# Patient Record
Sex: MALE | Race: WHITE | Employment: FULL TIME | URBAN - METROPOLITAN AREA
[De-identification: names, ages, dates, MRNs, and addresses within clinical notes are randomized per-mention and may not be internally consistent; named-entity substitution may affect disease eponyms.]

---

## 2020-09-27 ENCOUNTER — HOSPITAL ENCOUNTER (EMERGENCY)
Age: 25
Discharge: HOME | End: 2020-09-27
Attending: EMERGENCY MEDICINE
Payer: COMMERCIAL

## 2020-09-27 ENCOUNTER — APPOINTMENT (OUTPATIENT)
Dept: GENERAL RADIOLOGY | Age: 25
End: 2020-09-27
Attending: EMERGENCY MEDICINE
Payer: COMMERCIAL

## 2020-09-27 VITALS
TEMPERATURE: 98.7 F | SYSTOLIC BLOOD PRESSURE: 108 MMHG | HEART RATE: 70 BPM | RESPIRATION RATE: 16 BRPM | OXYGEN SATURATION: 99 % | DIASTOLIC BLOOD PRESSURE: 64 MMHG | WEIGHT: 150 LBS | BODY MASS INDEX: 25.61 KG/M2 | HEIGHT: 64 IN

## 2020-09-27 DIAGNOSIS — S43.014A ANTERIOR DISLOCATION OF RIGHT SHOULDER, INITIAL ENCOUNTER: Primary | ICD-10-CM

## 2020-09-27 PROCEDURE — 99284 EMERGENCY DEPT VISIT MOD MDM: CPT

## 2020-09-27 PROCEDURE — 74011250636 HC RX REV CODE- 250/636: Performed by: EMERGENCY MEDICINE

## 2020-09-27 PROCEDURE — 73020 X-RAY EXAM OF SHOULDER: CPT

## 2020-09-27 PROCEDURE — 75810000301 HC ER LEVEL 1 CLOSED TREATMNT FRACTURE/DISLOCATION

## 2020-09-27 PROCEDURE — 73030 X-RAY EXAM OF SHOULDER: CPT

## 2020-09-27 RX ORDER — MIDAZOLAM HYDROCHLORIDE 1 MG/ML
2 INJECTION, SOLUTION INTRAMUSCULAR; INTRAVENOUS ONCE
Status: COMPLETED | OUTPATIENT
Start: 2020-09-27 | End: 2020-09-27

## 2020-09-27 RX ORDER — DICLOFENAC SODIUM 50 MG/1
50 TABLET, DELAYED RELEASE ORAL 3 TIMES DAILY
Qty: 30 TAB | Refills: 0 | Status: SHIPPED | OUTPATIENT
Start: 2020-09-27 | End: 2020-10-07

## 2020-09-27 RX ORDER — PROPOFOL 10 MG/ML
100 INJECTION, EMULSION INTRAVENOUS
Status: COMPLETED | OUTPATIENT
Start: 2020-09-27 | End: 2020-09-27

## 2020-09-27 RX ADMIN — MIDAZOLAM 2 MG: 1 INJECTION INTRAMUSCULAR; INTRAVENOUS at 16:45

## 2020-09-27 RX ADMIN — PROPOFOL 100 MG: 10 INJECTION, EMULSION INTRAVENOUS at 16:55

## 2020-09-27 NOTE — ED TRIAGE NOTES
Patient to triage via wheelchair; mask in place. Patient reports dislocating his right shoulder while playing hurling. Reports hx of dislocations of this shoulder.

## 2020-09-27 NOTE — DISCHARGE INSTRUCTIONS
Wear the sling at all times for the next 2 to 3 days. You may remove it when you shower but try to keep your arm still when you do so. You will have a lot of swelling in and around your shoulder joint. You may ice it for 10 to 15 minutes at a time 3-4 times a day to try to help decrease the swelling. Given how many times you have dislocated your shoulder I encourage you to call an orthopedic surgeon to be evaluated for a potential surgical correction. Please call his office to make the appointment.     No physical activity involving use of your right arm for at least 4 weeks and until you are pain-free, or until you are cleared by the orthopedist.

## 2020-09-27 NOTE — ED PROVIDER NOTES
43-year-old gentleman presents with concerns about a right shoulder dislocation. He says this is happened to him 9 or 10 times. He says he does not have the money to follow-up with an orthopedist to get it permanently fixed. No fevers or chills and says he has no other injury. He said he was playing the [employer]. No other associated symptoms. Elements of this note were created using speech recognition software. As such, errors of speech recognition may be present. No past medical history on file. No past surgical history on file. No family history on file.     Social History     Socioeconomic History    Marital status: Not on file     Spouse name: Not on file    Number of children: Not on file    Years of education: Not on file    Highest education level: Not on file   Occupational History    Not on file   Social Needs    Financial resource strain: Not on file    Food insecurity     Worry: Not on file     Inability: Not on file    Transportation needs     Medical: Not on file     Non-medical: Not on file   Tobacco Use    Smoking status: Not on file   Substance and Sexual Activity    Alcohol use: Not on file    Drug use: Not on file    Sexual activity: Not on file   Lifestyle    Physical activity     Days per week: Not on file     Minutes per session: Not on file    Stress: Not on file   Relationships    Social connections     Talks on phone: Not on file     Gets together: Not on file     Attends Holiness service: Not on file     Active member of club or organization: Not on file     Attends meetings of clubs or organizations: Not on file     Relationship status: Not on file    Intimate partner violence     Fear of current or ex partner: Not on file     Emotionally abused: Not on file     Physically abused: Not on file     Forced sexual activity: Not on file   Other Topics Concern    Not on file   Social History Narrative    Not on file         ALLERGIES: Patient has no known allergies. Review of Systems   Constitutional: Negative for chills and fever. Musculoskeletal: Positive for arthralgias and myalgias. Negative for joint swelling. Skin: Negative for color change. Vitals:    09/27/20 1555   BP: 99/65   Pulse: 78   Resp: 16   Temp: 98.7 °F (37.1 °C)   SpO2: 96%   Weight: 68 kg (150 lb)   Height: 5' 4\" (1.626 m)            Physical Exam  Vitals signs and nursing note reviewed. Constitutional:       Appearance: Normal appearance. Musculoskeletal:      Comments: Obvious right shoulder dislocation    Normal radial pulses. Normal sensation and motor function of hand and arm. Skin:     General: Skin is warm and dry. Neurological:      General: No focal deficit present. Mental Status: He is alert. [facility]  ED Course as of Sep 27 1812   Sun Sep 27, 2020   1809 Patient doing well. He was able to ambulate without dizziness or lightheadedness.   I will discharge him home.    [AC]      ED Course User Index  [AC] Ady Rao MD       Procedural Sedation    Date/Time: 9/27/2020 6:13 PM  Performed by: Ady Rao MD  Authorized by: Ady Rao MD     Consent:     Consent obtained:  Written    Consent given by:  Patient    Risks discussed:  Inadequate sedation and respiratory compromise necessitating ventilatory assistance and intubation    Alternatives discussed:  Analgesia without sedation  Indications:     Procedure performed:  Dislocation reduction    Procedure necessitating sedation performed by:  Physician performing sedation    Intended level of sedation:  Moderate (conscious sedation)  Pre-sedation assessment:     ASA classification: class 1 - normal, healthy patient      Neck mobility: normal      Mouth opening:  3 or more finger widths    Thyromental distance:  4 finger widths    Mallampati score:  I - soft palate, uvula, fauces, pillars visible    Pre-sedation assessments completed and reviewed: airway patency, cardiovascular function and mental status      History of difficult intubation: no    Immediate pre-procedure details:     Reassessment: Patient reassessed immediately prior to procedure      Reviewed: vital signs      Verified: bag valve mask available    Procedure details (see MAR for exact dosages):     Preoxygenation:  Nasal cannula    Sedation:  Propofol    Intra-procedure monitoring:  Cardiac monitor, blood pressure monitoring and continuous pulse oximetry    Intra-procedure events: none    Post-procedure details:     Specimens recovered:  None    Patient is stable for discharge or admission: yes      Patient tolerance: Tolerated well, no immediate complications  Reduction of Joint    Date/Time: 9/27/2020 6:14 PM  Performed by: Margoth Meng MD  Authorized by: Margoth Meng MD     Consent:     Consent obtained:  Written    Consent given by:  Patient    Risks discussed:  Pain and vascular damage    Alternatives discussed:  No treatment  Injury:     Injury location:  Shoulder    Shoulder injury location:  R shoulder    Hill-Sachs deformity: no    Pre-procedure assessment:     Neurological function: normal      Distal perfusion: normal      Range of motion: reduced    Sedation:     Sedation type: Moderate (conscious) sedation  Anesthesia (see MAR for exact dosages): Anesthesia method:  None  Procedure details:     Manipulation performed: yes      Immobilization:  Sling  Post-procedure assessment:     Neurological function: normal      Distal perfusion: normal      Range of motion: normal      Patient tolerance of procedure:   Tolerated well, no immediate complications

## 2020-09-27 NOTE — ED NOTES
I have reviewed discharge instructions with the patient. The patient verbalized understanding. Patient left ED via Discharge Method: ambulatory to Home with Father. Opportunity for questions and clarification provided. Patient given 1 scripts. No esign       To continue your aftercare when you leave the hospital, you may receive an automated call from our care team to check in on how you are doing. This is a free service and part of our promise to provide the best care and service to meet your aftercare needs.  If you have questions, or wish to unsubscribe from this service please call [phone]. Thank you for Choosing our [facility] Emergency Department.

## 2025-04-30 ENCOUNTER — OFFICE VISIT (OUTPATIENT)
Dept: ORTHOPEDIC SURGERY | Age: 30
End: 2025-04-30
Payer: COMMERCIAL

## 2025-04-30 VITALS — WEIGHT: 140 LBS | BODY MASS INDEX: 23.9 KG/M2 | HEIGHT: 64 IN

## 2025-04-30 DIAGNOSIS — M25.562 LEFT KNEE PAIN, UNSPECIFIED CHRONICITY: Primary | ICD-10-CM

## 2025-04-30 PROCEDURE — 99203 OFFICE O/P NEW LOW 30 MIN: CPT | Performed by: PHYSICIAN ASSISTANT

## 2025-04-30 NOTE — PROGRESS NOTES
[facility]          Patient ID:  Name: Malachi Alonzo  AGE/Gender: 30 y.o. male  MRN: [id number]  : 1995    Date of Consultation:  2025          ALLERGIES: No Known Allergies     CC:  Left Knee Pain    History: The patient presents today as a new patient complaining of   Left knee pain.  This started 2-1/2 weeks ago.  He does not recall any specific injury.  He does play [employer] hurling and started having pain after competing for couple days and thought this would get better with but it is continued to bother him.  He localizes this more to the anterior medial aspect of the left knee.  This bothers him with stairs.  He has some tightness at the lateral aspect of the knee.  He denies any catching or locking.  No prior knee surgeries.  He does have a history of chronic shoulder instability with recurrent dislocations. They have no other complaints or concerns.    Review of Systems:  Pertinent items are noted in HPI.    Past Medical History Includes: No past medical history on file., No past surgical history on file.    Family History: No family history on file.     Social History:   Social History     Tobacco Use    Smoking status: Some Days     Types: Cigarettes    Smokeless tobacco: Never   Substance Use Topics    Alcohol use: Not on file         Physical Exam:     General:  On exam the patient is a pleasant 30 y.o. male in no acute distress, A&O x 3. Ambulates without a walker or cane  MSK:  (Knees)  Right knee   No redness, rashes or wounds  No palpable effusion  ROM 0-130   Negative lateral joint line tenderness   Negative medial joint line tenderness  Patella is non-tender  No medial, lateral, posterior or anterior instability   No posterior lateral instability  No extension lag  Quad strength is 5/5  Good tibial step-off  negative lateral Josué.   negative medial Josué.   Calves Are non-tender  Sensation is normal  Negative Homans.   Good quad tone    Left knee  No

## 2025-05-02 ENCOUNTER — HOSPITAL ENCOUNTER (OUTPATIENT)
Dept: PHYSICAL THERAPY | Age: 30
Setting detail: RECURRING SERIES
Discharge: HOME | End: 2025-05-05
Payer: COMMERCIAL

## 2025-05-02 DIAGNOSIS — M22.2X2 PATELLOFEMORAL DISORDERS, LEFT KNEE: ICD-10-CM

## 2025-05-02 DIAGNOSIS — M25.562 LEFT KNEE PAIN, UNSPECIFIED CHRONICITY: Primary | ICD-10-CM

## 2025-05-02 DIAGNOSIS — R26.2 DIFFICULTY IN WALKING, NOT ELSEWHERE CLASSIFIED: ICD-10-CM

## 2025-05-02 PROCEDURE — 97161 PT EVAL LOW COMPLEX 20 MIN: CPT

## 2025-05-02 PROCEDURE — 97110 THERAPEUTIC EXERCISES: CPT

## 2025-05-02 ASSESSMENT — PAIN SCALES - GENERAL: PAINLEVEL_OUTOF10: 4

## 2025-05-02 NOTE — THERAPY EVALUATION
has not experienced knee swelling throughout this time nor any instability. His symptom pattern is with him awakening with knee stiffness and experiencing increased knee pain at the end of the day.   Patient Stated Goal(s):  \"Diagnose soreness, be able to play on 5/17\"  Initial Pain Level:      4/10   Post Session Pain Level:     4/10  Past Medical History/Comorbidities:   Mr. Alonzo  has no past medical history on file.  Mr. Alonzo  has no past surgical history on file. R ankle sprains. Shoulder dislocation w/ surgical repair.   Social History/Living Environment:   Patient lives with an adult   5 steps at home  Prior Level of Function/Work/Activity:   Prior Level of Function: Independent   Current Level of Function: Mod Independent   Occupation:       Learning:   Does the patient/guardian have any barriers to learning?: No barriers    Fall Risk Scale:   Rockwell Total Score: 0    Dominant Side:  right handed     OBJECTIVE   Observation  Pt demonstrates no peripatellar or joint line effusion of either knee. Neutral standing knee alignment, well developed quadriceps L/R.     ROM (Left/Right  in °) AROM(PROM)  Knee extension  0(+5)/0(+5)  Knee flexion   143 (pain)at end range/147    Strength (Left / Right)   Knee extension  85ft-lbs/115 ft-lbs (@ 70 deg knee flexion)  Knee flexion   49 flt-lbs/53 ft-lbs (@ 70 deg knee flexion    Joint Mobility  PFJ: Intact PF medial glide, tilt    Palpation  + left PF compression test. Careful left knee palpation showed pain at the left medial patellofemoral border but negative left medial/lateral joint line tenderness and negative left collateral ligament tenderness.     Special Tests  Neg tests (L and R) Varus/valgus at 0 and 20 deg, Lachmann, bounce home, Josué's, Ege's, Thessaly, Posterior drawer, posterior sag .     Outcome Measure:   Tool Used: Lower Extremity Functional Scale (LEFS)  Score:  Initial: 46/80 (57.5%) Most Recent: X/80 (Date: -- )   Interpretation

## 2025-05-02 NOTE — PROGRESS NOTES
Malachi Alonzo  : 1995  Primary: Maya Wilson Sc (Robi JIMENEZ)  Secondary:  [facility] [place] @ [place]  [address] [place] [place] [address]  Phone: [phone]  Fax: [phone] Plan Frequency: 1x to 2x/week  Plan of Care/Certification Expiration Date: 25        Plan of Care/Certification Expiration Date:  Plan of Care/Certification Expiration Date: 25    Frequency/Duration: Plan Frequency: 1x to 2x/week      Time In/Out:   Time In: 932  Time Out: 1015      PT Visit Info:         Visit Count:  1    OUTPATIENT PHYSICAL THERAPY:   Treatment Note 2025       Episode  (PT - L knee pain)               Treatment Diagnosis:    Left knee pain, unspecified chronicity  Patellofemoral disorders, left knee  Difficulty in walking, not elsewhere classified  Medical/Referring Diagnosis:    Left knee pain, unspecified chronicity    Referring Physician:  Augustine Raphael PA MD Orders:  PT Eval and Treat   Return MD Appt:  TBD   Date of Onset:  Onset Date: 25     Allergies:   Patient has no known allergies.  Restrictions/Precautions:   None      Interventions Planned (Treatment may consist of any combination of the following):     See Assessment Note    Subjective Comments:   See today's initial eval report.     Initial Pain Level:     4/10  Post Session Pain Level:      4/10  Medications Last Reviewed: 2025  Updated Objective Findings:  Findings from initial evaluation unless otherwise noted:  Observation  Pt demonstrates no peripatellar or joint line effusion of either knee. Neutral standing knee alignment, well developed quadriceps L/R.     ROM (Left/Right  in °) AROM(PROM)  Knee extension  0(+5)/0(+5)  Knee flexion   143 (pain)at end range/147    Strength (Left / Right)   Knee extension  85ft-lbs/115 ft-lbs (@ 70 deg knee flexion)  Knee flexion   49 flt-lbs/53 ft-lbs (@ 70 deg knee flexion    Joint Mobility  PFJ: Intact PF medial glide, tilt    Palpation  + left PF

## 2025-05-05 ENCOUNTER — HOSPITAL ENCOUNTER (OUTPATIENT)
Dept: PHYSICAL THERAPY | Age: 30
Setting detail: RECURRING SERIES
End: 2025-05-05
Payer: COMMERCIAL

## 2025-05-07 ENCOUNTER — APPOINTMENT (OUTPATIENT)
Dept: PHYSICAL THERAPY | Age: 30
End: 2025-05-07
Payer: COMMERCIAL

## 2025-05-16 ENCOUNTER — HOSPITAL ENCOUNTER (OUTPATIENT)
Dept: PHYSICAL THERAPY | Age: 30
Setting detail: RECURRING SERIES
Discharge: HOME | End: 2025-05-19
Payer: COMMERCIAL

## 2025-05-16 PROCEDURE — 97110 THERAPEUTIC EXERCISES: CPT

## 2025-05-16 NOTE — PROGRESS NOTES
Malachi Alonzo  : 1995  Primary: Maya Wilson Sc (Robi JIMENEZ)  Secondary:  [facility] @ [place] [address] [place] [place] [address]  Phone: [phone]  Fax: [phone] Plan Frequency: 1x to 2x/week  Plan of Care/Certification Expiration Date: 25        Plan of Care/Certification Expiration Date:  Plan of Care/Certification Expiration Date: 25    Frequency/Duration: Plan Frequency: 1x to 2x/week      Time In/Out:   Time In: 0800  Time Out: 0845      PT Visit Info:         Visit Count:  2    OUTPATIENT PHYSICAL THERAPY:   Treatment Note 2025       Episode  (PT - L knee pain)               Treatment Diagnosis:    Left knee pain, unspecified chronicity  Patellofemoral disorders, left knee  Difficulty in walking, not elsewhere classified  Medical/Referring Diagnosis:    Left knee pain, unspecified chronicity    Referring Physician:  Augustine Raphael PA MD Orders:  PT Eval and Treat   Return MD Appt:  TBD   Date of Onset:  Onset Date: 25     Allergies:   Patient has no known allergies.  Restrictions/Precautions:   None      Interventions Planned (Treatment may consist of any combination of the following):     See Assessment Note    Subjective Comments:   Pt reports no pain but states that the L knee feels a little unstable.    Initial Pain Level:   0/10  Post Session Pain Level:     no increase  Medications Last Reviewed: 2025  Updated Objective Findings:  Findings from initial evaluation unless otherwise noted:  Observation  Pt demonstrates no peripatellar or joint line effusion of either knee. Neutral standing knee alignment, well developed quadriceps L/R.     ROM (Left/Right  in °) AROM(PROM)  Knee extension  0(+5)/0(+5)  Knee flexion   143 (pain)at end range/147    Strength (Left / Right)   Knee extension  85ft-lbs/115 ft-lbs (@ 70 deg knee flexion)  Knee flexion   49 flt-lbs/53 ft-lbs (@ 70 deg knee flexion    Joint Mobility  PFJ: Intact PF medial

## 2025-05-23 ENCOUNTER — HOSPITAL ENCOUNTER (OUTPATIENT)
Dept: PHYSICAL THERAPY | Age: 30
Setting detail: RECURRING SERIES
Discharge: HOME | End: 2025-05-26
Payer: COMMERCIAL

## 2025-05-23 PROCEDURE — 97110 THERAPEUTIC EXERCISES: CPT

## 2025-05-23 ASSESSMENT — PAIN SCALES - GENERAL: PAINLEVEL_OUTOF10: 2

## 2025-05-23 NOTE — PROGRESS NOTES
Malachi Alonzo  : 1995  Primary: Maya Zepedax Sc  Secondary:  [facility] @ [place] [address] [place] [address]  Phone: [phone]  Fax: [phone]    PT Visit Info:    No data recorded   OT Visit Info:  No data recorded    OUTPATIENT THERAPY: 2025  Episode  Appt Desk        Malachi Alonzo did not show for his appointment for today.  Will plan to follow up next during next appointment.    []  Attempted to call patient    Thank you,  Elvin Griffin, PT    Future Appointments   Date Time Provider Department Center   2025  8:35 AM Augustine Raphael PA [facility] AMB   2025 12:30 PM Elvin Griffin, PT [facility]   2025 12:30 PM Elvin Griffin, PT [facility]

## 2025-05-23 NOTE — PROGRESS NOTES
Malachi Alonzo  : 1995  Primary: Maya Wilson Sc (Robi JIMENEZ)  Secondary:  [facility] @ [place] [address] [address] [place] [address]  Phone: [phone]  Fax: [phone] Plan Frequency: 1x to 2x/week  Plan of Care/Certification Expiration Date: 25        Plan of Care/Certification Expiration Date:  Plan of Care/Certification Expiration Date: 25    Frequency/Duration: Plan Frequency: 1x to 2x/week      Time In/Out:   Time In: 1255  Time Out: 1338      PT Visit Info:         Visit Count:  3    OUTPATIENT PHYSICAL THERAPY:   Treatment Note 2025       Episode  (PT - L knee pain)               Treatment Diagnosis:    Left knee pain, unspecified chronicity  Patellofemoral disorders, left knee  Difficulty in walking, not elsewhere classified  Medical/Referring Diagnosis:    Left knee pain, unspecified chronicity    Referring Physician:  Augustine Raphael PA MD Orders:  PT Eval and Treat   Return MD Appt:  TBD   Date of Onset:  Onset Date: 25     Allergies:   Patient has no known allergies.  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     Initial Pain Level:   2/10  Post Session Pain Level:     no increase  Medications Last Reviewed: 2025  Updated Objective Findings:  Findings from initial evaluation unless otherwise noted:  Observation  Pt demonstrates no peripatellar or joint line effusion of either knee. Neutral standing knee alignment, well developed quadriceps L/R.     ROM (Left/Right  in °) AROM(PROM)  Knee extension  0(+5)/0(+5)  Knee flexion   143 (pain)at end range/147    Strength

## 2025-06-13 ENCOUNTER — APPOINTMENT (OUTPATIENT)
Dept: PHYSICAL THERAPY | Age: 30
End: 2025-06-13
Payer: COMMERCIAL

## 2025-06-20 ENCOUNTER — HOSPITAL ENCOUNTER (OUTPATIENT)
Dept: PHYSICAL THERAPY | Age: 30
Setting detail: RECURRING SERIES
Discharge: HOME | End: 2025-06-23
Payer: COMMERCIAL

## 2025-06-20 PROCEDURE — 97110 THERAPEUTIC EXERCISES: CPT

## 2025-06-20 PROCEDURE — 97140 MANUAL THERAPY 1/> REGIONS: CPT

## 2025-06-20 ASSESSMENT — PAIN SCALES - GENERAL: PAINLEVEL_OUTOF10: 2

## 2025-06-20 NOTE — PROGRESS NOTES
Malachi Alonzo  : 1995  Primary: Maya Wilson Sc (Robi JIMENEZ)  Secondary[facility]  [facility] [place] [address] [address] [place] [address]  Phone: [phone]  Fax: [phone] Plan Frequency: 1x to 2x/week  Plan of Care/Certification Expiration Date: 25        Plan of Care/Certification Expiration Date:  Plan of Care/Certification Expiration Date: 25    Frequency/Duration: Plan Frequency: 1x to 2x/week      Time In/Out:   Time In: 1225  Time Out: 1257      PT Visit Info:         Visit Count:  4    OUTPATIENT PHYSICAL THERAPY:   Progress Report / Treatment Note 2025       Episode  (PT - L knee pain)               Treatment Diagnosis:    Left knee pain, unspecified chronicity  Patellofemoral disorders, left knee  Difficulty in walking, not elsewhere classified  Medical/Referring Diagnosis:    Left knee pain, unspecified chronicity    Referring Physician:  Augustine Raphael PA MD Orders:  PT Eval and Treat   Return MD Appt:  TBD   Date of Onset:  Onset Date: 25     Allergies:   Patient has no known allergies.  Restrictions/Precautions:   None      Interventions Planned (Treatment may consist of any combination of the following):     See Assessment Note    Subjective Comments:   Cheri reports that he was doing well through his last visit, but has since developed lateral knee pain. This pain was increased over last weekend while playing in a tournament. He's able to pinpoint these symptoms to the side of his left knee at the lateral epicondyle, occurs when running. He notes that his current work is requiring ladder climbing.       Initial Pain Level:   2/10  Post Session Pain Level:     no increase  Medications Last Reviewed: 2025  Updated Objective Findings:  Findings from initial evaluation unless otherwise noted:  Observation  Pt demonstrates no peripatellar or joint line effusion of either knee. Neutral standing knee alignment, well developed quadriceps L/R.

## 2025-07-08 ENCOUNTER — RESULTS FOLLOW-UP (OUTPATIENT)
Dept: FAMILY MEDICINE CLINIC | Facility: CLINIC | Age: 30
End: 2025-07-08

## 2025-07-08 ENCOUNTER — OFFICE VISIT (OUTPATIENT)
Dept: FAMILY MEDICINE CLINIC | Facility: CLINIC | Age: 30
End: 2025-07-08
Payer: COMMERCIAL

## 2025-07-08 VITALS
HEIGHT: 64 IN | SYSTOLIC BLOOD PRESSURE: 100 MMHG | WEIGHT: 140 LBS | DIASTOLIC BLOOD PRESSURE: 60 MMHG | OXYGEN SATURATION: 96 % | HEART RATE: 61 BPM | BODY MASS INDEX: 23.9 KG/M2

## 2025-07-08 DIAGNOSIS — Z13.220 SCREENING CHOLESTEROL LEVEL: ICD-10-CM

## 2025-07-08 DIAGNOSIS — F32.A DEPRESSION, UNSPECIFIED DEPRESSION TYPE: Primary | ICD-10-CM

## 2025-07-08 DIAGNOSIS — N52.9 ERECTILE DYSFUNCTION, UNSPECIFIED ERECTILE DYSFUNCTION TYPE: ICD-10-CM

## 2025-07-08 DIAGNOSIS — Z11.59 ENCOUNTER FOR HEPATITIS C SCREENING TEST FOR LOW RISK PATIENT: ICD-10-CM

## 2025-07-08 DIAGNOSIS — R60.9 EDEMA, UNSPECIFIED TYPE: ICD-10-CM

## 2025-07-08 LAB
ALBUMIN SERPL-MCNC: 4.5 G/DL (ref 3.5–5)
ALBUMIN/GLOB SERPL: 1.5 (ref 1–1.9)
ALP SERPL-CCNC: 78 U/L (ref 40–129)
ALT SERPL-CCNC: 21 U/L (ref 8–55)
ANION GAP SERPL CALC-SCNC: 12 MMOL/L (ref 7–16)
AST SERPL-CCNC: 26 U/L (ref 15–37)
BASOPHILS # BLD: 0.05 K/UL (ref 0–0.2)
BASOPHILS NFR BLD: 1 % (ref 0–2)
BILIRUB SERPL-MCNC: 0.7 MG/DL (ref 0–1.2)
BUN SERPL-MCNC: 13 MG/DL (ref 6–23)
CALCIUM SERPL-MCNC: 9.6 MG/DL (ref 8.8–10.2)
CHLORIDE SERPL-SCNC: 103 MMOL/L (ref 98–107)
CHOLEST SERPL-MCNC: 135 MG/DL (ref 0–200)
CO2 SERPL-SCNC: 25 MMOL/L (ref 20–29)
CREAT SERPL-MCNC: 1.1 MG/DL (ref 0.8–1.3)
DIFFERENTIAL METHOD BLD: ABNORMAL
EOSINOPHIL # BLD: 0.12 K/UL (ref 0–0.8)
EOSINOPHIL NFR BLD: 2.3 % (ref 0.5–7.8)
ERYTHROCYTE [DISTWIDTH] IN BLOOD BY AUTOMATED COUNT: 12.2 % (ref 11.9–14.6)
GLOBULIN SER CALC-MCNC: 2.9 G/DL (ref 2.3–3.5)
GLUCOSE SERPL-MCNC: 92 MG/DL (ref 70–99)
HCT VFR BLD AUTO: 43.2 % (ref 41.1–50.3)
HCV AB SER QL: NONREACTIVE
HDLC SERPL-MCNC: 42 MG/DL (ref 40–60)
HDLC SERPL: 3.3 (ref 0–5)
HGB BLD-MCNC: 14.5 G/DL (ref 13.6–17.2)
IMM GRANULOCYTES # BLD AUTO: 0.02 K/UL (ref 0–0.5)
IMM GRANULOCYTES NFR BLD AUTO: 0.4 % (ref 0–5)
LDLC SERPL CALC-MCNC: 84 MG/DL (ref 0–100)
LYMPHOCYTES # BLD: 2.29 K/UL (ref 0.5–4.6)
LYMPHOCYTES NFR BLD: 44.4 % (ref 13–44)
MCH RBC QN AUTO: 31.5 PG (ref 26.1–32.9)
MCHC RBC AUTO-ENTMCNC: 33.6 G/DL (ref 31.4–35)
MCV RBC AUTO: 93.9 FL (ref 82–102)
MONOCYTES # BLD: 0.45 K/UL (ref 0.1–1.3)
MONOCYTES NFR BLD: 8.7 % (ref 4–12)
NEUTS SEG # BLD: 2.23 K/UL (ref 1.7–8.2)
NEUTS SEG NFR BLD: 43.2 % (ref 43–78)
NRBC # BLD: 0 K/UL (ref 0–0.2)
PLATELET # BLD AUTO: 227 K/UL (ref 150–450)
PMV BLD AUTO: 10.5 FL (ref 9.4–12.3)
POTASSIUM SERPL-SCNC: 4.5 MMOL/L (ref 3.5–5.1)
PROT SERPL-MCNC: 7.4 G/DL (ref 6.3–8.2)
RBC # BLD AUTO: 4.6 M/UL (ref 4.23–5.6)
SODIUM SERPL-SCNC: 139 MMOL/L (ref 136–145)
TRIGL SERPL-MCNC: 45 MG/DL (ref 0–150)
VLDLC SERPL CALC-MCNC: 9 MG/DL (ref 6–23)
WBC # BLD AUTO: 5.2 K/UL (ref 4.3–11.1)

## 2025-07-08 PROCEDURE — 99203 OFFICE O/P NEW LOW 30 MIN: CPT | Performed by: NURSE PRACTITIONER

## 2025-07-08 SDOH — ECONOMIC STABILITY: FOOD INSECURITY: WITHIN THE PAST 12 MONTHS, THE FOOD YOU BOUGHT JUST DIDN'T LAST AND YOU DIDN'T HAVE MONEY TO GET MORE.: PATIENT DECLINED

## 2025-07-08 SDOH — ECONOMIC STABILITY: FOOD INSECURITY: WITHIN THE PAST 12 MONTHS, YOU WORRIED THAT YOUR FOOD WOULD RUN OUT BEFORE YOU GOT MONEY TO BUY MORE.: PATIENT DECLINED

## 2025-07-08 SDOH — HEALTH STABILITY: PHYSICAL HEALTH: ON AVERAGE, HOW MANY MINUTES DO YOU ENGAGE IN EXERCISE AT THIS LEVEL?: 90 MIN

## 2025-07-08 SDOH — HEALTH STABILITY: PHYSICAL HEALTH: ON AVERAGE, HOW MANY DAYS PER WEEK DO YOU ENGAGE IN MODERATE TO STRENUOUS EXERCISE (LIKE A BRISK WALK)?: 4 DAYS

## 2025-07-08 ASSESSMENT — PATIENT HEALTH QUESTIONNAIRE - PHQ9
1. LITTLE INTEREST OR PLEASURE IN DOING THINGS: NOT AT ALL
2. FEELING DOWN, DEPRESSED OR HOPELESS: NOT AT ALL
SUM OF ALL RESPONSES TO PHQ QUESTIONS 1-9: 0

## 2025-07-08 ASSESSMENT — ENCOUNTER SYMPTOMS
ALLERGIC/IMMUNOLOGIC NEGATIVE: 1
GASTROINTESTINAL NEGATIVE: 1
RESPIRATORY NEGATIVE: 1
EYES NEGATIVE: 1

## 2025-07-08 NOTE — PROGRESS NOTES
[facility] [address]  [place] [address]   (ph) [phone] (fax) [phone]  JENNI Osborn      Chief Complaint   Patient presents with    New Patient     Establishing care, discuss ankle swelling, ADHD, and erectile dysfunction.        29 yo male comes in to get established. He wants to discuss ankle swelling, ADHD, and erectile dysfunction.           No Known Allergies    No past medical history on file.    No family history on file.    Social History     Socioeconomic History    Marital status: Single     Spouse name: Not on file    Number of children: Not on file    Years of education: Not on file    Highest education level: Not on file   Occupational History    Not on file   Tobacco Use    Smoking status: Never    Smokeless tobacco: Never   Substance and Sexual Activity    Alcohol use: Not Currently    Drug use: Yes     Types: Marijuana (Weed)    Sexual activity: Not on file   Other Topics Concern    Not on file   Social History Narrative    Not on file     Social Drivers of Health     Financial Resource Strain: Not on file   Food Insecurity: Patient Declined (7/8/2025)    Hunger Vital Sign     Worried About Running Out of Food in the Last Year: Patient declined     Ran Out of Food in the Last Year: Patient declined   Transportation Needs: Patient Declined (7/8/2025)    PRAPARE - Transportation     Lack of Transportation (Medical): Patient declined     Lack of Transportation (Non-Medical): Patient declined   Physical Activity: Sufficiently Active (7/8/2025)    Exercise Vital Sign     Days of Exercise per Week: 4 days     Minutes of Exercise per Session: 90 min   Stress: Not on file   Social Connections: Unknown (3/20/2021)    Received from [facility]    Social Connections     Frequency of Communication with Friends and Family: Not asked     Frequency of Social Gatherings with Friends and Family: Not asked   Intimate Partner

## 2025-07-17 ENCOUNTER — HOSPITAL ENCOUNTER (OUTPATIENT)
Dept: PHYSICAL THERAPY | Age: 30
Setting detail: RECURRING SERIES
Discharge: HOME | End: 2025-07-20
Payer: COMMERCIAL

## 2025-07-17 PROCEDURE — 97140 MANUAL THERAPY 1/> REGIONS: CPT

## 2025-07-17 PROCEDURE — 97110 THERAPEUTIC EXERCISES: CPT

## 2025-07-17 NOTE — PROGRESS NOTES
Malachi Alonzo  : 1995  Primary: Maya Wilson Sc (Robi JIMENEZ)  Secondary:  [facility] @ [place] [address] [address] [place] [address]  Phone: [phone]  Fax: [phone] Plan Frequency: 1x to 2x/week  Plan of Care/Certification Expiration Date: 25        Plan of Care/Certification Expiration Date:  Plan of Care/Certification Expiration Date: 25    Frequency/Duration: Plan Frequency: 1x to 2x/week      Time In/Out:   Time In: 1401  Time Out: 1446      PT Visit Info:         Visit Count:  5    OUTPATIENT PHYSICAL THERAPY:   Progress Report / Treatment Note 2025       Episode  (PT - L knee pain)               Treatment Diagnosis:    Left knee pain, unspecified chronicity  Patellofemoral disorders, left knee  Difficulty in walking, not elsewhere classified  Medical/Referring Diagnosis:    Left knee pain, unspecified chronicity    Referring Physician:  Augustine Raphael PA MD Orders:  PT Eval and Treat   Return MD Appt:  TBD   Date of Onset:  Onset Date: 25     Allergies:   Patient has no known allergies.  Restrictions/Precautions:   None      Interventions Planned (Treatment may consist of any combination of the following):     See Assessment Note    Subjective Comments:   Cheri reports milder symptoms, notes that he's been limiting his knee-stressing activities (hasn't been participating in practice sessions). He experienced L quadriceps soreness with split squats and had to limit that exercise. His quadriceps has felt tight. He plans on trying to ease back into [employer] curling practice sessions in preparation for a tournament in August.       Initial Pain Level:   2/10  Post Session Pain Level:     no increase  Medications Last Reviewed: 2025  Updated Objective Findings:  Findings from initial evaluation unless otherwise noted:  Observation  Pt demonstrates no peripatellar or joint line effusion of either knee. Neutral standing knee alignment, well

## 2025-07-20 RX ORDER — BUPROPION HYDROCHLORIDE 150 MG/1
TABLET, EXTENDED RELEASE ORAL
Qty: 60 TABLET | Refills: 1 | Status: SHIPPED | OUTPATIENT
Start: 2025-07-20

## 2025-08-04 ENCOUNTER — OFFICE VISIT (OUTPATIENT)
Dept: UROLOGY | Age: 30
End: 2025-08-04
Payer: COMMERCIAL

## 2025-08-04 DIAGNOSIS — A63.0 CONDYLOMA: ICD-10-CM

## 2025-08-04 DIAGNOSIS — N52.9 ERECTILE DYSFUNCTION, UNSPECIFIED ERECTILE DYSFUNCTION TYPE: Primary | ICD-10-CM

## 2025-08-04 PROCEDURE — 99244 OFF/OP CNSLTJ NEW/EST MOD 40: CPT | Performed by: UROLOGY

## 2025-08-04 RX ORDER — IMIQUIMOD 12.5 MG/.25G
CREAM TOPICAL
Qty: 1 EACH | Refills: 1 | Status: SHIPPED | OUTPATIENT
Start: 2025-08-04 | End: 2025-08-11

## 2025-08-04 ASSESSMENT — ENCOUNTER SYMPTOMS
BACK PAIN: 0
ABDOMINAL PAIN: 0
CONSTIPATION: 0
SKIN LESIONS: 0
WHEEZING: 0
EYE DISCHARGE: 0
EYE PAIN: 0
NAUSEA: 0
VOMITING: 0
INDIGESTION: 0
SHORTNESS OF BREATH: 0
COUGH: 0
DIARRHEA: 0
BLOOD IN STOOL: 0
HEARTBURN: 0

## 2025-08-07 ENCOUNTER — HOSPITAL ENCOUNTER (OUTPATIENT)
Dept: PHYSICAL THERAPY | Age: 30
Setting detail: RECURRING SERIES
Discharge: HOME | End: 2025-08-10
Payer: COMMERCIAL

## 2025-08-07 PROCEDURE — 97110 THERAPEUTIC EXERCISES: CPT
